# Patient Record
(demographics unavailable — no encounter records)

---

## 2024-10-29 NOTE — ASSESSMENT
[FreeTextEntry1] : 79-year-old male with history of PE, HTN, BPH  here for evaluation of DM Type 2.   DM Type 2:  -Metformin 1500 mg daily  -Check BG 1-2 times per week  -Carb controlled Diet  -Uptodate with Optho and Podiatry -Continue Alpha Lipoic acid for mild neuropathy (reported improvement in neuropathy)  HTN: -Continue Losartan 25 mg daily    Follow up in 3-4 months

## 2024-10-29 NOTE — PHYSICAL EXAM
[Alert] : alert [Well Nourished] : well nourished [No Acute Distress] : no acute distress [Normal Sclera/Conjunctiva] : normal sclera/conjunctiva [PERRL] : pupils equal, round and reactive to light [Normal Outer Ear/Nose] : the ears and nose were normal in appearance [Normal TMs] : both tympanic membranes were normal [No Neck Mass] : no neck mass was observed [Thyroid Not Enlarged] : the thyroid was not enlarged [No Respiratory Distress] : no respiratory distress [Clear to Auscultation] : lungs were clear to auscultation bilaterally [Normal S1, S2] : normal S1 and S2 [Normal Rate] : heart rate was normal [Regular Rhythm] : with a regular rhythm [Normal Bowel Sounds] : normal bowel sounds [Not Tender] : non-tender [Soft] : abdomen soft [Normal Gait] : normal gait [No Joint Swelling] : no joint swelling seen [Oriented x3] : oriented to person, place, and time [Normal Insight/Judgement] : insight and judgment were intact

## 2024-10-29 NOTE — REVIEW OF SYSTEMS
[Fatigue] : no fatigue [Decreased Appetite] : appetite not decreased [Recent Weight Gain (___ Lbs)] : no recent weight gain [Visual Field Defect] : no visual field defect [Dry Eyes] : no dryness [Dysphagia] : no dysphagia [Chest Pain] : no chest pain [Palpitations] : no palpitations [Shortness Of Breath] : no shortness of breath [Nausea] : no nausea [Vomiting] : no vomiting [Polyuria] : no polyuria [Hesistancy] : no hesitancy [Joint Pain] : no joint pain [Acanthosis] : no acanthosis  [Acne] : no acne [Headaches] : no headaches [Tremors] : no tremors [Polydipsia] : no polydipsia [Easy Bleeding] : no ~M tendency for easy bleeding [Easy Bruising] : no tendency for easy bruising

## 2024-10-29 NOTE — HISTORY OF PRESENT ILLNESS
[FreeTextEntry1] : Diabetes New Patient HPI  CC Patient referred by Dr. Delgadillo  for diabetes management.  HPI:  Duration of Diabetes:  10 years ( Borderline diabetes)         Is patient on Insulin? No          If yes, how long on insulin?          List Current Medications for Glycemic control and the doses: 1- Metformin 500 mg twice daily      2-       3-          SMBG (self monitored blood glucose) readings:  - Name of glucometer:           - How often does the patient check BG?   Not been checking           - Does the patient keep a log?              Does patient get Hypoglycemic episodes?  No   Diabetic Complications: Is patient aware of having any of those complications? - Eyes: Retinopathy? No retinal damage, Cataract surgery          	When was the last fully dilated eye exam? 4-5 months ago              - Feet: 	Neuropathy? Yes, in the feet                      	Foot Ulcers? No         	When was the last time patient saw a Podiatrist? 10/16/2024         Diet: review patient's diet:       Breakfast: Toast, eggs, porridge, oatmeal Lunch: Soup Dinner: Meat, potatoes  Snacks: [employer] M[employer]  Juice and Soda: [place] water    Exercise: review patient exercise habits:                   Walking

## 2024-11-12 NOTE — HISTORY OF PRESENT ILLNESS
[FreeTextEntry1] : Bilateral ear pain [de-identified] : Patient seen at [facility] emergency room on 11/8 for bilateral ear discomfort with decreased acuity.  Found to have impacted cerumen and external otitis culture positive for Klebsiella with pan-sensitivity.  Now on day #4 of Ciprodex eardrops with near complete resolution of symptoms.  Presents for reassessment after initial treatment. Also wishes to discuss recommendations for vaccines

## 2024-11-12 NOTE — PHYSICAL EXAM
[Normal] : no acute distress, well nourished, well developed and well-appearing [Normal Oropharynx] : the oropharynx was normal [de-identified] : Non-impacted cerumen left ear obstructing view of TM.  Right ear is clear but with mild erythema of TM

## 2024-11-12 NOTE — ASSESSMENT
[FreeTextEntry1] : External otitis Recommend continuation with Ciprodex to complete full 7-day course. Patient already has follow-up appointment for ENT at [facility] in about 10 days.  Possible otitis media (right ear) Will give short course of antibiotics.  Prescription submitted to pharmacy for cephalexin 500 mg with instructions for use.  Vaccine counseling High-dose quadrivalent flu vaccine administered left arm in office today. Recommend RSV vaccine at pharmacy.  Patient will consider. Also recommend updated variant specific vaccine at pharmacy but patient declines.

## 2024-12-16 NOTE — ASSESSMENT
[FreeTextEntry1] : R chronic otitis externa - CTTB 12/4/24: extreme thickening of R EAC wall - R OE treated with cipro PO, keflex PO, ciprodex drops, acetic acid drops -> has not resolved - ofloxacin drops and clotrimazole cream sent by Chandan at last visit - clotrimazole cream applied to right ear today - more clotrimazole cream sent to patient's pharmacy - right ear culture 12/12/24: rare mold like fungus referred to mycology - f/u on Thursday 12/19  Right Cerumen Impaction - Right cerumen impaction removed under microscopy with instrumentation  L otitis externa - previously treated with gentian violet and cipro PO -> resolved - previous left ear culture grew pseudomonas aeruginosa, Klebsiella PNA  Bilateral SNHL - right: borderline nl down to profound SNHL; left: mild down to severe SNHL  Chronic allergic rhinitis - continue with flonase spray as needed

## 2024-12-16 NOTE — HISTORY OF PRESENT ILLNESS
[de-identified] : NEUROTOLOGY NOTE Referral from Dr. Hawley. I reviewed his prior notes.  12/16/24: 79M with a hx of multiple prior ear infections. He has been treated by Dr. Hawley for left otitis externa which has resolved. He is currently being treated for right otitis externa since July 2024. He has been on ciprofloxacin PO, keflex PO, ciprodex drops, acetic acid drops. A right ear culture was recently sent by Dr. Hawley.

## 2024-12-16 NOTE — DATA REVIEWED
[de-identified] : Audiogram personally reviewed and interpreted and my findings are as follows (1/25/24) Right: SRT 35dB; %; Type A tymp; borderline nl down to profound SNHL Left: SRT 35dB; %; Type A tymp; mild down to severe MHL [de-identified] : CT temporal bone scan slides visually reviewed and personally interpreted as follows (12/4/24): thickening of R EAC skin, L superior canal dehiscence, otherwise normal temporal bones bilaterally  CT temporal bone scan radiology read: IMPRESSION: No evidence of skull base osteomyelitis or mastoiditis.  A.D.: Soft tissue thickening of the right external auditory canal and tympanic membrane, likely reflecting chronic inflammatory change. Minimal adjacent middle ear opacification, without erosive change. Possible small incidental dehiscence along the tegmen mastoideum.  A.S.: No evidence of active infection. Superior semicircular canal dehiscence. Suggestion of a small focal dehiscence along the tegmen tympani. [de-identified] : R ear culture 12/12/24: rare mold like fungus referred to mycology Outside L ear culture 11/8/24 ([facility]): klebsiella pneumoniae and pseudomonas aeruginosa

## 2024-12-16 NOTE — HISTORY OF PRESENT ILLNESS
[de-identified] : NEUROTOLOGY NOTE Referral from Dr. Hawley. I reviewed his prior notes.  12/16/24: 79M with a hx of multiple prior ear infections. He has been treated by Dr. Hawley for left otitis externa which has resolved. He is currently being treated for right otitis externa since July 2024. He has been on ciprofloxacin PO, keflex PO, ciprodex drops, acetic acid drops. A right ear culture was recently sent by Dr. Hawley.

## 2024-12-16 NOTE — DATA REVIEWED
[de-identified] : Audiogram personally reviewed and interpreted and my findings are as follows (1/25/24) Right: SRT 35dB; %; Type A tymp; borderline nl down to profound SNHL Left: SRT 35dB; %; Type A tymp; mild down to severe MHL [de-identified] : CT temporal bone scan slides visually reviewed and personally interpreted as follows (12/4/24): thickening of R EAC skin, L superior canal dehiscence, otherwise normal temporal bones bilaterally  CT temporal bone scan radiology read: IMPRESSION: No evidence of skull base osteomyelitis or mastoiditis.  A.D.: Soft tissue thickening of the right external auditory canal and tympanic membrane, likely reflecting chronic inflammatory change. Minimal adjacent middle ear opacification, without erosive change. Possible small incidental dehiscence along the tegmen mastoideum.  A.S.: No evidence of active infection. Superior semicircular canal dehiscence. Suggestion of a small focal dehiscence along the tegmen tympani. [de-identified] : R ear culture 12/12/24: rare mold like fungus referred to mycology Outside L ear culture 11/8/24 ([facility]): klebsiella pneumoniae and pseudomonas aeruginosa

## 2024-12-16 NOTE — PHYSICAL EXAM
[TextEntry] : General: AAO, no significant distress Psychiatric: affect pleasant and within normal limits Eyes: relatively symmetric, no obvious nystagmus Skin: no significant lesions on face Nose: no significant lesions; patent. Oral Cavity & Oropharynx: no significant deformity or lesions Neck/Lymphatics: no significant masses or abnormal cervical nodes palpated Respiratory: breathing comfortably; no significant distress Neurologic: cranial nerves II-XII grossly intact; EOMI Facial function: symmetric   Ear examination performed under binocular otologic microscope: Left Ear External: Pinna and periauricular area is normal. Canal: Ear canal skin is not inflamed or edematous. Coated in gentian violet Tympanic Membrane: Intact and in good position.   Right Ear External: Pinna and periauricular area is normal. Canal: Ear canal skin is not inflamed or edematous. Impacted with brown cerumen and fluid -> debrided, clotrimazole cream applied Tympanic Membrane: Intact and in good position.  Procedure: Right cerumen removal Pre-operative Diagnosis: Right cerumen impaction Post-operative Diagnosis: Right cerumen impaction Anesthesia: None Procedure Details: The patient was placed in the supine position. The right ear canal was determined to be impacted with cerumen. A curette and/or suction was used to remove the cerumen impaction under microscopy. Condition: Stable. Patient tolerated procedure well. Complications: None.  Nasal Endoscopy:   Pre-operative Diagnosis: chronic rhinitis Post-operative Diagnosis: same Anesthesia: None Procedure: Bilateral nasal endoscopy Procedure Details:   The patient was placed in the seated position sitting straight up. The telescope was passed along the left nasal floor to the nasopharynx. It was then passed into the region of the middle meatus, middle turbinate, and the sphenoethmoid region. An identical procedure was performed on the right side.  Findings: Interior nasal cavity: normal bilaterally Middle and superior meatus: normal bilaterally Sphenoethmoidal recess: normal bilaterally Mucosa: normal bilaterally Nasal septum: normal Discharge: none bilaterally Turbinates: ENLARGED AND BOGGY INFERIOR TURBINATES BILATERALLY Adenoid: normal bilaterally Posterior choanae: normal bilaterally Eustachian tubes: normal bilaterally Mucous stranding: COPIOUS SECRETIONS BILATERALLY Lesions: Not present    Comments:   Condition: Stable. Patient tolerated procedure well.

## 2024-12-19 NOTE — DATA REVIEWED
[de-identified] : Audiogram personally reviewed and interpreted and my findings are as follows (1/25/24) Right: SRT 35dB; %; Type A tymp; borderline nl down to profound SNHL Left: SRT 35dB; %; Type A tymp; mild down to severe MHL [de-identified] : CT temporal bone scan slides visually reviewed and personally interpreted as follows (12/4/24): thickening of R EAC skin, L superior canal dehiscence, otherwise normal temporal bones bilaterally  CT temporal bone scan radiology read: IMPRESSION: No evidence of skull base osteomyelitis or mastoiditis.  A.D.: Soft tissue thickening of the right external auditory canal and tympanic membrane, likely reflecting chronic inflammatory change. Minimal adjacent middle ear opacification, without erosive change. Possible small incidental dehiscence along the tegmen mastoideum.  A.S.: No evidence of active infection. Superior semicircular canal dehiscence. Suggestion of a small focal dehiscence along the tegmen tympani. [de-identified] : R ear culture 12/12/24: rare mold like fungus referred to mycology Outside L ear culture 11/8/24 ([facility]): klebsiella pneumoniae and pseudomonas aeruginosa

## 2024-12-19 NOTE — PHYSICAL EXAM
[TextEntry] : General: AAO, no significant distress Psychiatric: affect pleasant and within normal limits Eyes: relatively symmetric, no obvious nystagmus Skin: no significant lesions on face Nose: no significant lesions; patent. Oral Cavity & Oropharynx: no significant deformity or lesions Neck/Lymphatics: no significant masses or abnormal cervical nodes palpated Respiratory: breathing comfortably; no significant distress Neurologic: cranial nerves II-XII grossly intact; EOMI Facial function: symmetric   Ear examination performed under binocular otologic microscope: Left Ear External: Pinna and periauricular area is normal. Canal: Ear canal skin is not inflamed or edematous. Coated in gentian violet Tympanic Membrane: Intact and in good position.   Right Ear External: Pinna and periauricular area is normal. Canal: Ear canal skin is not inflamed or edematous. Brown cerumen and fluid debrided under microscopy with instrumentation. clotrimazole cream applied Tympanic Membrane: Intact and in good position.  Procedure: Right cerumen removal Pre-operative Diagnosis: Right cerumen impaction Post-operative Diagnosis: Right cerumen impaction Anesthesia: None Procedure Details: The patient was placed in the supine position. The right ear canal was determined to be impacted with cerumen. A curette and/or suction was used to remove the cerumen impaction under microscopy. Condition: Stable. Patient tolerated procedure well. Complications: None.

## 2024-12-19 NOTE — ASSESSMENT
[FreeTextEntry1] : R chronic otitis externa - chronic issue - moderate risk of morbidity with continued treated with clotrimazole crema - CTTB 12/4/24: extreme thickening of R EAC wall - R OE treated with cipro PO, keflex PO, ciprodex drops, acetic acid drops -> has not resolved - ofloxacin drops and clotrimazole cream sent by Chandan at last visit - clotrimazole cream applied to right ear 12/16/24, 12/19/24 - right ear culture 12/12/24: rare mold like fungus referred to mycology - f/u on 12/31  Right Cerumen Impaction - Right cerumen impaction removed under microscopy with instrumentation  L otitis externa - previously treated with gentian violet and cipro PO -> resolved - previous left ear culture grew pseudomonas aeruginosa, Klebsiella PNA  Bilateral SNHL - chronic issue - audiogram reviewed with patient - asymmetric SNHL, CT IAC w contrast does not show retrocochlear pathology - right: borderline nl down to profound SNHL; left: mild down to severe SNHL  Chronic allergic rhinitis - continue with flonase spray as needed

## 2024-12-19 NOTE — HISTORY OF PRESENT ILLNESS
[de-identified] : NEUROTOLOGY NOTE Referral from Dr. Hawley. I reviewed his prior notes.  12/16/24: 79M with a hx of multiple prior ear infections. He has been treated by Dr. Hawley for left otitis externa which has resolved. He is currently being treated for right otitis externa since July 2024. He has been on ciprofloxacin PO, keflex PO, ciprodex drops, acetic acid drops. A right ear culture was recently sent by Dr. Hawley.   12/19/24: Clotrimazole cream placed in right ear canal last visit. Patient noticed a drastic improvement. There is less brown pus coming out of the right ear and there is less pain.

## 2024-12-31 NOTE — PHYSICAL EXAM
[Removed] : palatine tonsils previously removed [Binocular Microscopic Exam] : Binocular microscopic exam was performed [Normal] : the left middle ear was normal [FreeTextEntry8] : the remaining clotrimazole was suctioned away revealing a grossly normalized canal & TM [FreeTextEntry9] : residual gentian violet [FreeTextEntry1] : after discussion of his diffiuclt-to-control fungal infections, the option of gentian violet was discussed with the patient and he desired to proceed. Accordingly, working under the scope, but ear canals & TMs were painted w/ gentian violet using a small piece of a cotton ball on an alligator

## 2024-12-31 NOTE — HISTORY OF PRESENT ILLNESS
[de-identified] : Known to the clinic because of multiple prior external ear infections; he now returns after Dr. Rosenthal instilled the clotrimazole that I ordered with dramatic improvement of his R ear. Baseline hearing loss.  Reflux controlled on pantoprazole q AM & famotidine q HS; no further tamar dysphagia. Stable voice.  He states that he has renal cancer (doesn't know the stage) which is being followed w/o surgery at [facility].  On warfarin for hypercoaguability. Has a pacer and can't have MRIs. Has Parkinsons.

## 2024-12-31 NOTE — DATA REVIEWED
[de-identified] : 1/24: nl to prof SNHL AD, mild to sev SNHL AS. Asymmetric L worse at 2-4k, R worse at 6-8k [de-identified] : 10/24: creat 1.24; hgb A1C 6.1% 12/24 R ear: Aspergillus [de-identified] : 12/24 CT t-bones: R EAC/TM soft tissue thickening [de-identified] : EGD report 12/20: tortuous esophagus, mild gastritis. HRM rec. \par  MBS 9/21: mild pharyngoesoph. dysphagia w/ delayed esophageal emptying

## 2025-01-13 NOTE — COUNSELING
[Potential consequences of obesity discussed] : Potential consequences of obesity discussed [Benefits of weight loss discussed] : Benefits of weight loss discussed [Structured Weight Management Program suggested:] : Structured weight management program suggested [Encouraged to maintain food diary] : Encouraged to maintain food diary [Encouraged to increase physical activity] : Encouraged to increase physical activity [Encouraged to use exercise tracking device] : Encouraged to use exercise tracking device [Target Wt Loss Goal ___] : Weight Loss Goals: Target weight loss goal [unfilled] lbs [Weigh Self Weekly] : weigh self weekly [Decrease Portions] : decrease portions [Keep Food Diary] : keep food diary [Healthy eating counseling provided] : healthy eating [Activity counseling provided] : activity [Good understanding] : Patient has a good understanding of lifestyle changes and the steps needed to achieve self management goals [FreeTextEntry4] : 15

## 2025-01-13 NOTE — HISTORY OF PRESENT ILLNESS
[FreeTextEntry1] : 79-year-old male with T2 DM,HTN,GERD, Factor V Leiden mutation,  HLD, pacemaker, BPH, dilated aortic root ,and renal mass,returns for yearly CPE. In the past year, he denies hospitalization, surgery, or new medical diagnosis apart from dilated aortic root. [de-identified] : He denies any recent hospitalizations.  Is developing mild forgetfulness but does not have signs of dementia. Would like to review blood work done recently by endocrinology.  Complains of foul-smelling urine and would like urinalysis.  Is concerned that he is dehydrated. Otherwise no active complaints at this time.

## 2025-01-13 NOTE — ASSESSMENT
[FreeTextEntry1] : Health Maintenance His BMI is good at 27 and no weight loss is recommended at this time.   No STD risk or substance abuse per patient report. Occasional gender specific self-examination is suggested. No depression. Competent with ADLs. Completed colonoscopy in 10/2023 which showed 2 tubular adenomas.  Follow-up colonoscopy recommended in 2026. Has completed COVID-vaccine series with 1 booster.  Declines any further boosters even though boosting with updated variant specific vaccine is recommended. Completed shingles vaccine series but requires booster PCV-20 vaccine.  This was administered in his left arm in office today. Has previously received seasonal flu vaccine. Patient advised to make arrangements to get RSV vaccine at pharmacy.  History of multiple DVT/factor V Leiden mutation. REVIEWED hematology note from 10/2020.  Patient is heterozygous for factor V Leiden and, given history of multiple prior DVT, should remain on anticoagulation.  Patient will follow-up with hematology  Dilated aortic root. Patient states that this is being followed by his cardiologist every year or 2 with CT scans or echocardiograms and has been stable. At patient request, given referral for [facility] cardiologist for future management  Renal mass Under care of [facility] urologist.  REVIEWED most recent abdominal CT scan (2/2024) was entirely normal with no acute abdominal or pelvic pathology, stable aneurysmal dilatation of ascending thoracic aorta, and long-term stability of solid nodules in the left kidney.. Further evaluation does not seem necessary at this point.  Chronic GERD Patient states symptoms have been stable on famotidine 40 mg without requirement for PPI medication. Advised to continue and prescription renewed.  Patient declines tapering to 20 mg at this time. Reevaluate in 3-4 months.  HTN BP taken x2 in office today. 133/68. 130/70 Reviewed prior recorded blood pressures which have typically varied over the past year between 128 and 146 systolic. Patient states he has been compliant with losartan 50 mg.  He is also on propranolol for essential tremor. Discussed AHA recommendations for SBP under 140 if possible. No changes recommended in blood pressure regimen for now but will follow up in 2 to 3 months.  Foul-smelling urine Patient denies UTI symptoms including dysuria, perineal pain, suprapubic pain, or hematuria. Reviewed differential diagnosis including smell imparted by medication and/or food. Dehydration (patient's concern) seems unlikely etiology for this condition. Will send follow-up studies today for urinalysis and urine culture (at patient request) and will discuss when results are available  T2DM with peripheral neuropathy REVIEWED serial hemoglobin A1c results which show consistent improvement from 7.1 in 2022 down to 6.1 in 10/2024. Patient urged to be fully compliant with current treatment which includes metformin 1000 mg twice a day, well-tolerated. Explained that the alpha lipoic acid is added to help with blood sugars but especially to help with mild peripheral neuropathy.  Patient reassured that this is a safe medication and that he should maintain full compliance, especially as he already reports improvement in peripheral neuropathy.  Medication review All current medications were reviewed with attention to indication, frequency, and dosage. Patient appears to have a good knowledge of all of the above factors.  No refills are necessary at this time.

## 2025-01-13 NOTE — HEALTH RISK ASSESSMENT
[Good] : ~his/her~  mood as  good [No] : In the past 12 months have you used drugs other than those required for medical reasons? No [No falls in past year] : Patient reported no falls in the past year [0] : 2) Feeling down, depressed, or hopeless: Not at all (0) [PHQ-2 Negative - No further assessment needed] : PHQ-2 Negative - No further assessment needed [HIV test declined] : HIV test declined [Hepatitis C test declined] : Hepatitis C test declined [With Significant Other] : lives with significant other [Retired] : retired [] :  [Fully functional (bathing, dressing, toileting, transferring, walking, feeding)] : Fully functional (bathing, dressing, toileting, transferring, walking, feeding) [Fully functional (using the telephone, shopping, preparing meals, housekeeping, doing laundry, using] : Fully functional and needs no help or supervision to perform IADLs (using the telephone, shopping, preparing meals, housekeeping, doing laundry, using transportation, managing medications and managing finances) [Seat Belt] :  uses seat belt [Sunscreen] : uses sunscreen [Patient declined discussion] : Patient declined discussion [Patient reported colonoscopy was abnormal] : Patient reported colonoscopy was abnormal [With Patient/Caregiver] : , with patient/caregiver [Audit-CScore] : 0 [[id number]] : 0 [Change in mental status noted] : No change in mental status noted [Sexually Active] : not sexually active [TB Exposure] : is not being exposed to tuberculosis [ColonoscopyDate] : 12/2023 [ColonoscopyComments] : TA x 2 [AdvancecareDate] : 01/2025 [FreeTextEntry4] : [facility]

## 2025-01-13 NOTE — PHYSICAL EXAM
[No Carotid Bruits] : no carotid bruits [No Edema] : there was no peripheral edema [No Hernias] : no hernias [Urethral Meatus] : meatus normal [Penis Abnormality] : normal circumcised penis [Scrotum] : the scrotum was normal [Epididymis] : the epididymides were normal [Testes Tenderness] : no tenderness of the testes [Testes Mass (___cm)] : there were no testicular masses [Normal] : affect was normal and insight and judgment were intact [de-identified] : Drooping panniculus is present bilaterally.  No nodule, mass, or other abnormalities are palpated on either side. [de-identified] : 1 cm long/0.5 cm wide slightly discolored, flat, nonflaking, superficial area of roughness on back of left hand.

## 2025-01-21 NOTE — DATA REVIEWED
[de-identified] : 1/24: nl to prof SNHL AD, mild to sev SNHL AS. Asymmetric L worse at 2-4k, R worse at 6-8k [de-identified] : 10/24: creat 1.24; hgb A1C 6.1% 12/24 R ear: Aspergillus [de-identified] : 12/24 CT t-bones: R EAC/TM soft tissue thickening [de-identified] : EGD report 12/20: tortuous esophagus, mild gastritis. HRM rec. \par  MBS 9/21: mild pharyngoesoph. dysphagia w/ delayed esophageal emptying

## 2025-01-21 NOTE — HISTORY OF PRESENT ILLNESS
[de-identified] : Known to the clinic because of multiple prior external ear infections; he now returns with the complaint that the ears "feel squishy." Baseline hearing loss.  Reflux controlled on pantoprazole q AM & famotidine q HS; no further tamar dysphagia. Stable voice.  He states that he has renal cancer (doesn't know the stage) which is being followed w/o surgery at [facility].  On warfarin for hypercoaguability. Has a pacer and can't have MRIs. Has Parkinsons.

## 2025-01-21 NOTE — DATA REVIEWED
[de-identified] : 1/24: nl to prof SNHL AD, mild to sev SNHL AS. Asymmetric L worse at 2-4k, R worse at 6-8k [de-identified] : 10/24: creat 1.24; hgb A1C 6.1% 12/24 R ear: Aspergillus [de-identified] : 12/24 CT t-bones: R EAC/TM soft tissue thickening [de-identified] : EGD report 12/20: tortuous esophagus, mild gastritis. HRM rec. \par  MBS 9/21: mild pharyngoesoph. dysphagia w/ delayed esophageal emptying

## 2025-01-21 NOTE — ASSESSMENT
[FreeTextEntry1] : I reassured him; he has an appointment upcoming for a rpt  and we will recheck his ears then.

## 2025-01-21 NOTE — PHYSICAL EXAM
[Removed] : palatine tonsils previously removed [Binocular Microscopic Exam] : Binocular microscopic exam was performed [Normal] : the left middle ear was normal [FreeTextEntry8] : obstructing cerumen removed with suction [FreeTextEntry9] : obstructing cerumen removed with suction & hook [FreeTextEntry1] : after discussion of his diffiuclt-to-control fungal infections, the option of gentian violet was discussed with the patient and he desired to proceed. Accordingly, working under the scope, but ear canals & TMs were painted w/ gentian violet using a small piece of a cotton ball on an alligator

## 2025-01-21 NOTE — HISTORY OF PRESENT ILLNESS
[de-identified] : Known to the clinic because of multiple prior external ear infections; he now returns with the complaint that the ears "feel squishy." Baseline hearing loss.  Reflux controlled on pantoprazole q AM & famotidine q HS; no further tamar dysphagia. Stable voice.  He states that he has renal cancer (doesn't know the stage) which is being followed w/o surgery at [facility].  On warfarin for hypercoaguability. Has a pacer and can't have MRIs. Has Parkinsons.

## 2025-02-26 NOTE — ASSESSMENT
[FreeTextEntry1] : Results review All results from 8/24 - 1/25 were REVIEWED with patient and any questions that he had regarding them were answered to his satisfaction. Specifically, patient was reassured that there is no evidence of urine issues including UTI. He was also reassured that there is no evidence of dehydration based on BUN/creatinine ratio (19/1.2) with GFR at 62. Reviewed hemoglobin A1c from October at 6.1, improved from 6.2 in August and from 6.8 in March. Advised to maintain current medical regimen as prescribed by endocrinology.  Peripheral neuropathy Agree with patient that pins and needle sensation in feet (also numbness) is likely symptomatic of peripheral neuropathy due to long-term diabetes (even though it has been very well-managed) as well as advanced age. Recommend trial of gabapentin or similar medication v consultation with podiatrist.  (Note the patient already has independent podiatrist).  After further discussion, patient has opted to discuss with his podiatrist rather than to start on new treatment at this time.

## 2025-02-26 NOTE — HISTORY OF PRESENT ILLNESS
[Home] : at home, [unfilled] , at the time of the visit. [Medical Office: ([facility])___] : at the medical office located in  [Telephone (audio)] : This telephonic visit was provided via audio only technology. [No access to tele-video equipment] : patient lacks access to tele-video equipment. [Verbal consent obtained from patient] : the patient, [unfilled] [FreeTextEntry1] : Results review Possible peripheral neuropathy [de-identified] : Patient wishes to review recent blood work and urine results specifically with concern for possible dehydration.  He also complains of slowly progressive pins and needle sensation in bilateral feet without any effect on functioning.  Suspects may be peripheral neuropathy.  Wishes to discuss diagnosis and management.

## 2025-02-26 NOTE — HISTORY OF PRESENT ILLNESS
[Home] : at home, [unfilled] , at the time of the visit. [Medical Office: ([facility])___] : at the medical office located in  [Telephone (audio)] : This telephonic visit was provided via audio only technology. [No access to tele-video equipment] : patient lacks access to tele-video equipment. [Verbal consent obtained from patient] : the patient, [unfilled] [FreeTextEntry1] : Results review Possible peripheral neuropathy [de-identified] : Patient wishes to review recent blood work and urine results specifically with concern for possible dehydration.  He also complains of slowly progressive pins and needle sensation in bilateral feet without any effect on functioning.  Suspects may be peripheral neuropathy.  Wishes to discuss diagnosis and management.

## 2025-04-14 NOTE — PHYSICAL EXAM
[Removed] : palatine tonsils previously removed [Binocular Microscopic Exam] : Binocular microscopic exam was performed [Normal] : the left middle ear was normal [FreeTextEntry8] : Obstructing cerumen was removed with a hook [FreeTextEntry9] : the canal was filled with gray material with hyphae; this was suctioned free revealing a mildly inflamed canal [FreeTextEntry1] : after discussion of his diffiuclt-to-control fungal infections, the option of gentian violet was discussed with the patient and he desired to proceed. Accordingly, working under the scope, but ear canals & TMs were painted w/ gentian violet using a small piece of a cotton ball on an alligator

## 2025-04-14 NOTE — HISTORY OF PRESENT ILLNESS
[de-identified] : Known to the clinic because of multiple prior fungal external ear infections; he now returns with pain and hearing worsened in the L ear for the last few days. Previously instilled clotrimazole cream has worked best for his condition, but he doesn't know if he still has the tube. Baseline hearing loss. His ears itch and he sticks his finger in them.   Reflux controlled on pantoprazole q AM & famotidine q HS; no further tamar dysphagia. Stable voice.  He states that he has renal cancer (doesn't know the stage) which is being followed w/o surgery at [facility].  On warfarin for hypercoaguability. Has a pacer and can't have MRIs. Has has a tremor that he says is not from Parkinsons.

## 2025-04-14 NOTE — DATA REVIEWED
[de-identified] : 1/24: nl to prof SNHL AD, mild to sev SNHL AS. Asymmetric L worse at 2-4k, R worse at 6-8k 3/25: mod to prof SNHL AU; /90; less asymmetry [de-identified] : 10/24: creat 1.24; hgb A1C 6.1% 12/24 R ear: Aspergillus [de-identified] : 12/24 CT t-bones: R EAC/TM soft tissue thickening [de-identified] : EGD report 12/20: tortuous esophagus, mild gastritis. HRM rec. \par  MBS 9/21: mild pharyngoesoph. dysphagia w/ delayed esophageal emptying

## 2025-04-14 NOTE — ASSESSMENT
[FreeTextEntry1] : He will RTC with clotrimazole for installation here. We also discussed starting acetic acid once daily after this infection clears to prevent recurrences; I stressed keeping the ear dry.

## 2025-04-29 NOTE — ASSESSMENT
[FreeTextEntry1] : 79-year-old male with history of PE, HTN, BPH  here for f/u of DM Type 2.   DM Type 2:  -Metformin 1500 mg daily  -Check BG 1-2 times per week  -Carb controlled Diet  -Up-to-date with Optho and Podiatry -Restart Alpha Lipoic acid for mild neuropathy (reported improvement in neuropathy)  HTN: -Continue Losartan 25 mg daily    Follow up in 3-4 months

## 2025-05-01 NOTE — PHYSICAL EXAM
[Removed] : palatine tonsils previously removed [Binocular Microscopic Exam] : Binocular microscopic exam was performed [Normal] : the left middle ear was normal [FreeTextEntry9] : the canal was filled with wet cerumen and gray material; this was suctioned free revealing a mildly inflamed canal. The clotrimazole was loaded into a syring and the canal was filled [FreeTextEntry1] : after discussion of his diffiuclt-to-control fungal infections, the option of gentian violet was discussed with the patient and he desired to proceed. Accordingly, working under the scope, but ear canals & TMs were painted w/ gentian violet using a small piece of a cotton ball on an alligator

## 2025-05-01 NOTE — HISTORY OF PRESENT ILLNESS
[de-identified] : Known to the clinic because of multiple prior fungal external ear infections; he now returns with ongoing worsened hearing and irritation of the L ear; the R has improved on the new regimen of daily acetic acid. He returns to have clotrimazole cream placed in the ear. Baseline hearing loss. His ears itch and he sticks his finger in them.   Reflux controlled on pantoprazole q AM & famotidine q HS; no further tamar dysphagia. Stable voice.  He states that he has renal cancer (doesn't know the stage) which is being followed w/o surgery at [facility].  On warfarin for hypercoaguability. Has a pacer and can't have MRIs. Has has a tremor that he says is not from Parkinsons.

## 2025-05-01 NOTE — DATA REVIEWED
[de-identified] : 1/24: nl to prof SNHL AD, mild to sev SNHL AS. Asymmetric L worse at 2-4k, R worse at 6-8k 3/25: mod to prof SNHL AU; /90; less asymmetry [de-identified] : 10/24: creat 1.24; hgb A1C 6.1% 12/24 R ear: Aspergillus [de-identified] : 12/24 CT t-bones: R EAC/TM soft tissue thickening [de-identified] : EGD report 12/20: tortuous esophagus, mild gastritis. HRM rec. \par  MBS 9/21: mild pharyngoesoph. dysphagia w/ delayed esophageal emptying

## 2025-05-12 NOTE — PHYSICAL EXAM
[Normal] : no acute distress, well nourished, well developed and well-appearing [de-identified] : Scattered uninfected, nontender, superficial bumps of right lower extremity with scabbing (highly consistent with arthropod/flea bites)

## 2025-05-12 NOTE — HISTORY OF PRESENT ILLNESS
[FreeTextEntry1] : Blood result review/T2DM Dyspnea on exertion (recurrent) Concern for "leg ulcers "

## 2025-05-12 NOTE — ASSESSMENT
[FreeTextEntry1] : T2DM REVIEWED most recent hemoglobin A1c from 4/29 = 6.7. Explained that this is an acceptable result (as long as it is under 7.5) and that he should continue on his current regimen and follow-up with endocrinology.  Blood result review At patient request, reviewed all blood test results from the past 6 months, all of which are within normal range including cholesterol (LDL in the 50s on medication).  Recurrent dyspnea on exertion Patient reminded that he has had this complaint off and on since 2019. States that he has followed up already with his cardiologist at [place] who does not feel he has underlying cardiac disease that would cause dyspnea.  He denies cough, audible wheezing, chest pain, or shortness of breath at rest. Discussed differential diagnosis including deconditioning, aging, or possible underlying lung disease.  Allergy-related cough variant asthma also possible given time of year (although less likely in his age group). Will arrange to have CT of chest done within the next month or 2.  In the meantime, patient encouraged to start a diagnostic trial of an "asthma inhaler".  After further discussion, he states he is willing to proceed with this.  Prescription submitted for albuterol MDI with instructions for use (1 puff 2-3 times a day on a standing basis for the next week or 2). Further discussion once CT chest results are available.  Consider referral to pulmonary medicine if symptoms persist (note that patient was last seen by pulmonary medicine in 2023).   Concern for "leg ulcer " Patient was reassured that findings on exam (scattered shallow abrasions which are healing well without infection) do not constitute ulceration but are more consistent with arthropod bites or similar superficial irritation (worsened with scratching). Recommend home treatment to include daily cleaning with warm water and unscented soap and application of over-the-counter antibiotic ointment.  Would anticipate full resolution in the next week or 2.  If lesions worsen, patient will contact me for further evaluation.

## 2025-05-12 NOTE — ASSESSMENT
[FreeTextEntry1] : T2DM REVIEWED most recent hemoglobin A1c from 4/29 = 6.7. Explained that this is an acceptable result (as long as it is under 7.5) and that he should continue on his current regimen and follow-up with endocrinology.  Blood result review At patient request, reviewed all blood test results from the past 6 months, all of which are within normal range including cholesterol (LDL in the 50s on medication).  Recurrent dyspnea on exertion Patient reminded that he has had this complaint off and on since 2019. States that he has followed up already with his cardiologist at [place] who does not feel he has underlying cardiac disease that would cause dyspnea.  He denies cough, audible wheezing, chest pain, or shortness of breath at rest. Discussed differential diagnosis including deconditioning, aging, or possible underlying lung disease.  Allergy-related cough variant asthma also possible given time of year (although less likely in his age group). Will arrange to have CT of chest done within the next month or 2.  In the meantime, patient encouraged to start a diagnostic trial of an "asthma inhaler".  After further discussion, he states he is willing to proceed with this.  Prescription submitted for albuterol MDI with instructions for use (1 puff 2-3 times a day on a standing basis for the next week or 2). Further discussion once CT chest results are available.  Consider referral to pulmonary medicine if symptoms persist (note that patient was last seen by pulmonary medicine in 2023).   Concern for "leg ulcer " Patient was reassured that findings on exam (scattered shallow abrasions which are healing well without infection) do not constitute ulceration but are more consistent with arthropod bites or similar superficial irritation (worsened with scratching). Recommend home treatment to include daily cleaning with warm water and unscented soap and application of over-the-counter antibiotic ointment.  Would anticipate full resolution in the next week or 2.  If lesions worsen, patient will contact me for further evaluation.

## 2025-05-12 NOTE — PHYSICAL EXAM
[Normal] : no acute distress, well nourished, well developed and well-appearing [de-identified] : Scattered uninfected, nontender, superficial bumps of right lower extremity with scabbing (highly consistent with arthropod/flea bites)

## 2025-05-23 NOTE — HISTORY OF PRESENT ILLNESS
[de-identified] : Known to the clinic because of multiple prior fungal external ear infections; he now returns for an ear cleaning as he is concerned about this happening again. His L ear is itchy but the R remains ok on a regimen of once daily acetic acid. Baseline hearing loss. His ears itch and he sticks his finger in them.   Reflux controlled on pantoprazole q AM & famotidine q HS; no further tamar dysphagia. Stable voice.  He states that he has renal cancer (doesn't know the stage) which is being followed w/o surgery at [facility].  On warfarin for hypercoaguability. Has a pacer and can't have MRIs. Has has a tremor that he says is not from Parkinsons.

## 2025-05-23 NOTE — PHYSICAL EXAM
[Removed] : palatine tonsils previously removed [Binocular Microscopic Exam] : Binocular microscopic exam was performed [Normal] : the left middle ear was normal [FreeTextEntry8] : Obstructing cerumen was removed with a hook [FreeTextEntry9] : Copious presumed remaining clotrimazole cream was cleared with suction [FreeTextEntry1] : after discussion of his diffiuclt-to-control fungal infections, the option of gentian violet was discussed with the patient and he desired to proceed. Accordingly, working under the scope, but ear canals & TMs were painted w/ gentian violet using a small piece of a cotton ball on an alligator

## 2025-05-23 NOTE — DATA REVIEWED
[de-identified] : 1/24: nl to prof SNHL AD, mild to sev SNHL AS. Asymmetric L worse at 2-4k, R worse at 6-8k 3/25: mod to prof SNHL AU; /90; less asymmetry [de-identified] : 10/24: creat 1.24; hgb A1C 6.1% 12/24 R ear: Aspergillus [de-identified] : 12/24 CT t-bones: R EAC/TM soft tissue thickening [de-identified] : EGD report 12/20: tortuous esophagus, mild gastritis. HRM rec. \par  MBS 9/21: mild pharyngoesoph. dysphagia w/ delayed esophageal emptying

## 2025-05-23 NOTE — ASSESSMENT
[FreeTextEntry1] : He will begin the acetic acid once qd in both ears; RTC for an ear cleaning in 6 months; annual audios, sooner prn any changes.